# Patient Record
Sex: FEMALE | Race: BLACK OR AFRICAN AMERICAN | NOT HISPANIC OR LATINO | Employment: UNEMPLOYED | URBAN - METROPOLITAN AREA
[De-identification: names, ages, dates, MRNs, and addresses within clinical notes are randomized per-mention and may not be internally consistent; named-entity substitution may affect disease eponyms.]

---

## 2017-04-11 ENCOUNTER — WALK IN (OUTPATIENT)
Dept: URGENT CARE | Age: 52
End: 2017-04-11

## 2017-04-11 VITALS
RESPIRATION RATE: 16 BRPM | TEMPERATURE: 98.2 F | HEART RATE: 92 BPM | DIASTOLIC BLOOD PRESSURE: 70 MMHG | OXYGEN SATURATION: 98 % | SYSTOLIC BLOOD PRESSURE: 110 MMHG

## 2017-04-11 DIAGNOSIS — J01.00 ACUTE NON-RECURRENT MAXILLARY SINUSITIS: Primary | ICD-10-CM

## 2017-04-11 PROCEDURE — 99213 OFFICE O/P EST LOW 20 MIN: CPT | Performed by: NURSE PRACTITIONER

## 2017-04-11 RX ORDER — MERCAPTOPURINE 50 MG/1
50 TABLET ORAL DAILY
COMMUNITY
End: 2022-01-24 | Stop reason: ALTCHOICE

## 2017-04-11 RX ORDER — AMOXICILLIN AND CLAVULANATE POTASSIUM 875; 125 MG/1; MG/1
1 TABLET, FILM COATED ORAL 2 TIMES DAILY
Qty: 20 TABLET | Refills: 0 | Status: SHIPPED | OUTPATIENT
Start: 2017-04-11 | End: 2017-04-21

## 2018-06-12 ENCOUNTER — WALK IN (OUTPATIENT)
Dept: URGENT CARE | Age: 53
End: 2018-06-12

## 2018-06-12 VITALS
SYSTOLIC BLOOD PRESSURE: 114 MMHG | TEMPERATURE: 99.3 F | OXYGEN SATURATION: 99 % | HEART RATE: 82 BPM | DIASTOLIC BLOOD PRESSURE: 72 MMHG | RESPIRATION RATE: 16 BRPM

## 2018-06-12 DIAGNOSIS — J06.9 ACUTE URI: Primary | ICD-10-CM

## 2018-06-12 LAB — S PYO AG THROAT QL: NEGATIVE

## 2018-06-12 PROCEDURE — 87880 STREP A ASSAY W/OPTIC: CPT | Performed by: NURSE PRACTITIONER

## 2018-06-12 PROCEDURE — 99213 OFFICE O/P EST LOW 20 MIN: CPT | Performed by: NURSE PRACTITIONER

## 2020-06-23 ENCOUNTER — TELEPHONE ENCOUNTER (OUTPATIENT)
Dept: URBAN - METROPOLITAN AREA CLINIC 115 | Facility: CLINIC | Age: 55
End: 2020-06-23

## 2020-09-27 ENCOUNTER — ERX REFILL RESPONSE (OUTPATIENT)
Age: 55
End: 2020-09-27

## 2020-09-27 ENCOUNTER — TELEPHONE ENCOUNTER (OUTPATIENT)
Dept: URBAN - METROPOLITAN AREA CLINIC 92 | Facility: CLINIC | Age: 55
End: 2020-09-27

## 2020-09-27 RX ORDER — MERCAPTOPURINE 50 MG/1
TAKE 1 TABLET BY MOUTH EVERY DAY TABLET ORAL ONCE A DAY
Qty: 30 TABLET | Refills: 0

## 2020-09-27 RX ORDER — MERCAPTOPURINE 50 MG/1
TAKE 1 TABLET BY MOUTH EVERY DAY TABLET ORAL
Qty: 30 TABLET | Refills: 0
Start: 2020-01-16

## 2020-10-08 ENCOUNTER — OFFICE VISIT (OUTPATIENT)
Dept: URBAN - METROPOLITAN AREA TELEHEALTH 2 | Facility: TELEHEALTH | Age: 55
End: 2020-10-08
Payer: COMMERCIAL

## 2020-10-08 DIAGNOSIS — K21.9 GASTROESOPHAGEAL REFLUX DISEASE WITHOUT ESOPHAGITIS: ICD-10-CM

## 2020-10-08 DIAGNOSIS — K50.00 CROHN'S DISEASE OF SMALL INTESTINE WITHOUT COMPLICATION: ICD-10-CM

## 2020-10-08 PROCEDURE — 99213 OFFICE O/P EST LOW 20 MIN: CPT | Performed by: INTERNAL MEDICINE

## 2020-10-08 PROCEDURE — G8427 DOCREV CUR MEDS BY ELIG CLIN: HCPCS | Performed by: INTERNAL MEDICINE

## 2020-10-08 PROCEDURE — G8420 CALC BMI NORM PARAMETERS: HCPCS | Performed by: INTERNAL MEDICINE

## 2020-10-08 PROCEDURE — 1036F TOBACCO NON-USER: CPT | Performed by: INTERNAL MEDICINE

## 2020-10-08 PROCEDURE — G8484 FLU IMMUNIZE NO ADMIN: HCPCS | Performed by: INTERNAL MEDICINE

## 2020-10-08 PROCEDURE — 3017F COLORECTAL CA SCREEN DOC REV: CPT | Performed by: INTERNAL MEDICINE

## 2020-10-08 PROCEDURE — G9903 PT SCRN TBCO ID AS NON USER: HCPCS | Performed by: INTERNAL MEDICINE

## 2020-10-08 RX ORDER — ONDANSETRON 8 MG/1
TAKE 1 TABLET BY MOUTH TWICE A DAY AS NEEDED FOR NAUSEA TABLET ORAL
Qty: 180 | Refills: 0 | Status: ACTIVE | COMMUNITY
Start: 2019-08-20

## 2020-10-08 RX ORDER — MERCAPTOPURINE 50 MG/1
TAKE 1 TABLET BY MOUTH EVERY DAY TABLET ORAL ONCE A DAY
Qty: 30 TABLET | Refills: 0 | Status: ACTIVE | COMMUNITY

## 2020-10-08 RX ORDER — MERCAPTOPURINE 50 MG/1
TAKE 1 TABLET BY MOUTH EVERY DAY TABLET ORAL ONCE A DAY
Qty: 90 TABLET | Refills: 2

## 2020-10-08 RX ORDER — ESOMEPRAZOLE MAGNESIUM 40 MG/1
TAKE 1 CAPSULE BY MOUTH EVERY DAY CAPSULE, DELAYED RELEASE PELLETS ORAL
Qty: 90 | Refills: 3 | Status: ACTIVE | COMMUNITY
Start: 2020-01-20

## 2020-10-08 NOTE — HPI-TODAY'S VISIT:
Mr. Mak was seen today after last visit in office in August 2018.  She has a history of small bowel Crohn's disease which is under remission on 6-MP 50 mg once a day.  She also have constipation for which she takes MiraLAX and MiraLAX is helping her symptoms.  She reports having reflux and epigastric discomfort and she had been PPI intermittently.  Patient reports having labs done with her primary care physician within the last week and was told to be unremarkable.  She admits to having increased anxiety anxiety with ongoing pandemic however recently she started seeing a therapist which is helping her symptoms also she takes Xanax as needed basis denies any unintentional weight loss denies any diarrhea nausea vomiting. Last colonoscopy was March 2019 there is no evidence of active disease for surveillance biopsies were negative for dysplasia.

## 2021-02-15 ENCOUNTER — TELEPHONE ENCOUNTER (OUTPATIENT)
Dept: URBAN - METROPOLITAN AREA CLINIC 115 | Facility: CLINIC | Age: 56
End: 2021-02-15

## 2021-02-15 ENCOUNTER — TELEPHONE ENCOUNTER (OUTPATIENT)
Dept: URBAN - METROPOLITAN AREA CLINIC 92 | Facility: CLINIC | Age: 56
End: 2021-02-15

## 2021-02-15 RX ORDER — ESOMEPRAZOLE MAGNESIUM 40 MG/1
TAKE 1 CAPSULE BY MOUTH EVERY DAY CAPSULE, DELAYED RELEASE PELLETS ORAL
Qty: 90 | Refills: 3 | Status: ACTIVE | COMMUNITY
Start: 2020-01-20

## 2021-02-15 RX ORDER — MERCAPTOPURINE 50 MG/1
TAKE 1 TABLET BY MOUTH EVERY DAY TABLET ORAL ONCE A DAY
Qty: 90 TABLET | Refills: 2 | Status: ACTIVE | COMMUNITY

## 2021-02-15 RX ORDER — MERCAPTOPURINE 50 MG/1
TAKE 1 TABLET BY MOUTH EVERY DAY TABLET ORAL ONCE A DAY
Qty: 90 TABLET

## 2021-02-15 RX ORDER — ONDANSETRON 8 MG/1
TAKE 1 TABLET BY MOUTH TWICE A DAY AS NEEDED FOR NAUSEA TABLET ORAL
Qty: 180 | Refills: 0 | Status: ACTIVE | COMMUNITY
Start: 2019-08-20

## 2021-02-15 RX ORDER — DICYCLOMINE HYDROCHLORIDE 20 MG/1
1/2 TO 1  TABLET TABLET ORAL THREE TIMES A DAY
Qty: 60 TABLET | Refills: 0 | OUTPATIENT
Start: 2021-02-16 | End: 2021-03-18

## 2021-02-25 ENCOUNTER — TELEPHONE ENCOUNTER (OUTPATIENT)
Dept: URBAN - METROPOLITAN AREA CLINIC 92 | Facility: CLINIC | Age: 56
End: 2021-02-25

## 2021-02-25 ENCOUNTER — OFFICE VISIT (OUTPATIENT)
Dept: URBAN - METROPOLITAN AREA TELEHEALTH 2 | Facility: TELEHEALTH | Age: 56
End: 2021-02-25
Payer: COMMERCIAL

## 2021-02-25 ENCOUNTER — LAB OUTSIDE AN ENCOUNTER (OUTPATIENT)
Dept: URBAN - METROPOLITAN AREA TELEHEALTH 2 | Facility: TELEHEALTH | Age: 56
End: 2021-02-25

## 2021-02-25 DIAGNOSIS — K21.00 GASTROESOPHAGEAL REFLUX DISEASE WITH ESOPHAGITIS WITHOUT HEMORRHAGE: ICD-10-CM

## 2021-02-25 DIAGNOSIS — R13.19 OTHER DYSPHAGIA: ICD-10-CM

## 2021-02-25 DIAGNOSIS — K50.80 CROHN'S DISEASE OF BOTH SMALL AND LARGE INTESTINE: ICD-10-CM

## 2021-02-25 PROBLEM — 266433003: Status: ACTIVE | Noted: 2021-02-25

## 2021-02-25 PROCEDURE — 99213 OFFICE O/P EST LOW 20 MIN: CPT | Performed by: INTERNAL MEDICINE

## 2021-02-25 RX ORDER — MERCAPTOPURINE 50 MG/1
TAKE 1 TABLET BY MOUTH EVERY DAY TABLET ORAL ONCE A DAY
Qty: 90 TABLET | Refills: 0

## 2021-02-25 RX ORDER — ONDANSETRON 8 MG/1
TAKE 1 TABLET BY MOUTH TWICE A DAY AS NEEDED FOR NAUSEA TABLET ORAL
Qty: 180 | Refills: 0 | Status: ACTIVE | COMMUNITY
Start: 2019-08-20

## 2021-02-25 RX ORDER — ESOMEPRAZOLE MAGNESIUM 40 MG/1
TAKE 1 CAPSULE BY MOUTH EVERY DAY CAPSULE, DELAYED RELEASE PELLETS ORAL
Qty: 90 | Refills: 3 | Status: ACTIVE | COMMUNITY
Start: 2020-01-20

## 2021-02-25 RX ORDER — MERCAPTOPURINE 50 MG/1
TAKE 1 TABLET BY MOUTH EVERY DAY TABLET ORAL ONCE A DAY
Qty: 90 TABLET | Refills: 2 | Status: ACTIVE | COMMUNITY

## 2021-02-25 RX ORDER — DICYCLOMINE HYDROCHLORIDE 20 MG/1
1/2 TO 1  TABLET TABLET ORAL THREE TIMES A DAY
Qty: 60 TABLET | Refills: 0 | Status: ACTIVE | COMMUNITY
Start: 2021-02-16 | End: 2021-03-18

## 2021-02-25 NOTE — HPI-TODAY'S VISIT:
pt was seen today after last visit in office in August 2018.  She has a history of small bowel Crohn's disease which is under remission on 6-MP 50 mg once a day.  She also have constipation for which she takes MiraLAX and MiraLAX is helping her symptoms.  She reports having reflux and epigastric discomfort and she had been PPI intermittently.  Patient reports having labs done with her primary care physician within the last week and was told to be unremarkable.  She admits to having increased anxiety anxiety with ongoing pandemic however recently she started seeing a therapist which is helping her symptoms also she takes Xanax as needed basis denies any unintentional weight loss denies any diarrhea nausea vomiting. Last colonoscopy was March 2019 there is no evidence of active disease for surveillance biopsies were negative for dysplasia.  2/25/21 : Ms. Mak was seen today over telehealth for complaints of having abdominal bloating acid reflux as well as hoarseness of voice and intermittent dysphagia.  Patient stated recently she has had abdominal bloating and cramping however denies any diarrhea.  Patient stated she had bloating after she ate food episode of loose stools after she ate outside and she thought initially it was a gastroenteritis however symptoms resolved spontaneously.  Denies any chronic diarrhea denies any blood mixed in the stools.  Denies any melanotic stools however she admits for anxiety causing her upper GI symptoms and lower GI symptoms worse.  She had been given Ativan for anxiety.  Other complaints also includes nausea for which she takes Zofran frequently.  Last upper endoscopy was in 2012 that showed erosive gastritis.  She is also have history of IBS. Regarding Crohn's disease she had small bowel Crohn's disease which has been stable on 6-MP.

## 2021-02-26 ENCOUNTER — DASHBOARD ENCOUNTERS (OUTPATIENT)
Age: 56
End: 2021-02-26

## 2021-03-06 ENCOUNTER — WEB ENCOUNTER (OUTPATIENT)
Dept: URBAN - METROPOLITAN AREA CLINIC 115 | Facility: CLINIC | Age: 56
End: 2021-03-06

## 2021-03-11 ENCOUNTER — WEB ENCOUNTER (OUTPATIENT)
Dept: URBAN - METROPOLITAN AREA CLINIC 115 | Facility: CLINIC | Age: 56
End: 2021-03-11

## 2021-03-15 ENCOUNTER — WEB ENCOUNTER (OUTPATIENT)
Dept: URBAN - METROPOLITAN AREA CLINIC 115 | Facility: CLINIC | Age: 56
End: 2021-03-15

## 2021-03-23 ENCOUNTER — TELEPHONE ENCOUNTER (OUTPATIENT)
Dept: URBAN - METROPOLITAN AREA CLINIC 92 | Facility: CLINIC | Age: 56
End: 2021-03-23

## 2021-03-23 ENCOUNTER — OFFICE VISIT (OUTPATIENT)
Dept: URBAN - METROPOLITAN AREA SURGERY CENTER 13 | Facility: SURGERY CENTER | Age: 56
End: 2021-03-23

## 2021-03-23 RX ORDER — ESOMEPRAZOLE MAGNESIUM 40 MG/1
TAKE 1 CAPSULE BY MOUTH EVERY DAY CAPSULE, DELAYED RELEASE PELLETS ORAL
Qty: 90 | Refills: 3 | Status: ACTIVE | COMMUNITY
Start: 2020-01-20

## 2021-03-23 RX ORDER — MERCAPTOPURINE 50 MG/1
TAKE 1 TABLET BY MOUTH EVERY DAY TABLET ORAL ONCE A DAY
Qty: 90 TABLET | Refills: 0 | Status: ACTIVE | COMMUNITY

## 2021-03-23 RX ORDER — ONDANSETRON 8 MG/1
TAKE 1 TABLET BY MOUTH TWICE A DAY AS NEEDED FOR NAUSEA TABLET ORAL
Qty: 180 | Refills: 0 | Status: ACTIVE | COMMUNITY
Start: 2019-08-20

## 2021-03-23 RX ORDER — PROMETHAZINE HYDROCHLORIDE 12.5 MG/1
1 TABLET AS NEEDED TABLET ORAL
Qty: 30 TABLET | Refills: 1 | OUTPATIENT
Start: 2021-03-23 | End: 2021-05-22

## 2021-03-25 ENCOUNTER — TELEPHONE ENCOUNTER (OUTPATIENT)
Dept: URBAN - METROPOLITAN AREA CLINIC 92 | Facility: CLINIC | Age: 56
End: 2021-03-25

## 2021-03-25 ENCOUNTER — OFFICE VISIT (OUTPATIENT)
Dept: URBAN - METROPOLITAN AREA SURGERY CENTER 13 | Facility: SURGERY CENTER | Age: 56
End: 2021-03-25
Payer: COMMERCIAL

## 2021-03-25 ENCOUNTER — LAB OUTSIDE AN ENCOUNTER (OUTPATIENT)
Dept: URBAN - METROPOLITAN AREA CLINIC 92 | Facility: CLINIC | Age: 56
End: 2021-03-25

## 2021-03-25 DIAGNOSIS — R10.13 ABDOMINAL DISCOMFORT, EPIGASTRIC: ICD-10-CM

## 2021-03-25 DIAGNOSIS — K29.80 ACUTE DUODENITIS: ICD-10-CM

## 2021-03-25 DIAGNOSIS — K22.2 ACQUIRED ESOPHAGEAL RING: ICD-10-CM

## 2021-03-25 DIAGNOSIS — K29.60 ADENOPAPILLOMATOSIS GASTRICA: ICD-10-CM

## 2021-03-25 DIAGNOSIS — K22.8 COLUMNAR-LINED ESOPHAGUS: ICD-10-CM

## 2021-03-25 PROBLEM — 56689002: Status: ACTIVE | Noted: 2020-10-08

## 2021-03-25 PROBLEM — 79922009: Status: ACTIVE | Noted: 2021-03-25

## 2021-03-25 PROCEDURE — G8907 PT DOC NO EVENTS ON DISCHARG: HCPCS | Performed by: INTERNAL MEDICINE

## 2021-03-25 PROCEDURE — 43239 EGD BIOPSY SINGLE/MULTIPLE: CPT | Performed by: INTERNAL MEDICINE

## 2021-03-25 PROCEDURE — 43248 EGD GUIDE WIRE INSERTION: CPT | Performed by: INTERNAL MEDICINE

## 2021-03-25 RX ORDER — ONDANSETRON 8 MG/1
TAKE 1 TABLET BY MOUTH TWICE A DAY AS NEEDED FOR NAUSEA TABLET ORAL
Qty: 180 | Refills: 0 | Status: ACTIVE | COMMUNITY
Start: 2019-08-20

## 2021-03-25 RX ORDER — SUCRALFATE 1 G
1 TABLET PRIOR TO MEALS TABLET ORAL THREE TIMES A DAY
Qty: 90 TABLET | Refills: 1 | OUTPATIENT
Start: 2021-03-25 | End: 2021-05-24

## 2021-03-25 RX ORDER — PROMETHAZINE HYDROCHLORIDE 12.5 MG/1
1 TABLET AS NEEDED TABLET ORAL
Qty: 30 TABLET | Refills: 1 | Status: ACTIVE | COMMUNITY
Start: 2021-03-23 | End: 2021-05-22

## 2021-03-25 RX ORDER — METHYLPREDNISOLONE 4 MG/1
AS DIRECTED TABLET ORAL ONCE A DAY
Qty: 21 TABLET | Refills: 0 | OUTPATIENT
Start: 2021-03-25 | End: 2021-03-30

## 2021-03-25 RX ORDER — MERCAPTOPURINE 50 MG/1
TAKE 1 TABLET BY MOUTH EVERY DAY TABLET ORAL ONCE A DAY
Qty: 90 TABLET | Refills: 0 | Status: ACTIVE | COMMUNITY

## 2021-03-25 RX ORDER — ESOMEPRAZOLE MAGNESIUM 40 MG/1
TAKE 1 CAPSULE BY MOUTH EVERY DAY CAPSULE, DELAYED RELEASE PELLETS ORAL
Qty: 90 | Refills: 3 | Status: ACTIVE | COMMUNITY
Start: 2020-01-20

## 2021-03-26 LAB
A/G RATIO: 1.6
ALBUMIN: 4.7
ALKALINE PHOSPHATASE: 76
ALT (SGPT): 13
AST (SGOT): 19
BASO (ABSOLUTE): 0
BASOS: 0
BILIRUBIN, TOTAL: 0.3
BUN/CREATININE RATIO: 13
BUN: 12
CALCIUM: 9.7
CARBON DIOXIDE, TOTAL: 18
CHLORIDE: 105
CREATININE: 0.95
EGFR IF AFRICN AM: 77
EGFR IF NONAFRICN AM: 67
EOS (ABSOLUTE): 0
EOS: 1
GLOBULIN, TOTAL: 3
GLUCOSE: 62
HEMATOCRIT: 36.2
HEMATOLOGY COMMENTS:: (no result)
HEMOGLOBIN: 12.5
IMMATURE CELLS: (no result)
IMMATURE GRANS (ABS): 0
IMMATURE GRANULOCYTES: 0
LIPASE: 35
LYMPHS (ABSOLUTE): 0.9
LYMPHS: 17
MCH: 29.9
MCHC: 34.5
MCV: 87
MONOCYTES(ABSOLUTE): 0.4
MONOCYTES: 8
NEUTROPHILS (ABSOLUTE): 4.1
NEUTROPHILS: 74
NRBC: (no result)
PLATELETS: 303
POTASSIUM: 4
PROTEIN, TOTAL: 7.7
RBC: 4.18
RDW: 13.8
SODIUM: 141
WBC: 5.4

## 2021-04-07 PROBLEM — 40890009: Status: ACTIVE | Noted: 2021-02-25

## 2021-04-07 PROBLEM — 266435005: Status: ACTIVE | Noted: 2020-10-08

## 2021-04-13 ENCOUNTER — OFFICE VISIT (OUTPATIENT)
Dept: URBAN - METROPOLITAN AREA SURGERY CENTER 13 | Facility: SURGERY CENTER | Age: 56
End: 2021-04-13

## 2021-04-13 ENCOUNTER — WEB ENCOUNTER (OUTPATIENT)
Dept: URBAN - METROPOLITAN AREA CLINIC 115 | Facility: CLINIC | Age: 56
End: 2021-04-13

## 2021-04-20 ENCOUNTER — OFFICE VISIT (OUTPATIENT)
Dept: URBAN - METROPOLITAN AREA SURGERY CENTER 13 | Facility: SURGERY CENTER | Age: 56
End: 2021-04-20

## 2021-04-22 ENCOUNTER — ERX REFILL RESPONSE (OUTPATIENT)
Dept: URBAN - METROPOLITAN AREA CLINIC 82 | Facility: CLINIC | Age: 56
End: 2021-04-22

## 2021-04-22 RX ORDER — ESOMEPRAZOLE MAGNESIUM 40 MG/1
TAKE 1 CAPSULE BY MOUTH EVERY DAY CAPSULE, DELAYED RELEASE ORAL
Qty: 90 | Refills: 2

## 2021-05-13 ENCOUNTER — WEB ENCOUNTER (OUTPATIENT)
Dept: URBAN - METROPOLITAN AREA CLINIC 115 | Facility: CLINIC | Age: 56
End: 2021-05-13

## 2021-05-16 ENCOUNTER — WEB ENCOUNTER (OUTPATIENT)
Dept: URBAN - METROPOLITAN AREA CLINIC 115 | Facility: CLINIC | Age: 56
End: 2021-05-16

## 2021-05-16 RX ORDER — ONDANSETRON 8 MG/1
TAKE 1 TABLET BY MOUTH TWICE A DAY AS NEEDED FOR NAUSEA TABLET ORAL
Qty: 180 | Refills: 0
Start: 2019-08-20

## 2021-05-17 ENCOUNTER — ERX REFILL RESPONSE (OUTPATIENT)
Dept: URBAN - METROPOLITAN AREA SURGERY CENTER 13 | Facility: SURGERY CENTER | Age: 56
End: 2021-05-17

## 2021-05-17 RX ORDER — SUCRALFATE 1 G/1
TAKE 1 TABLET BY MOUTH PRIOR TO MEALS THREE TIMES A DAY TABLET ORAL
Qty: 270 TABLET | Refills: 1 | OUTPATIENT

## 2021-05-17 RX ORDER — SUCRALFATE 1 G/1
1 TABLET PRIOR TO MEALS TABLET ORAL THREE TIMES A DAY
Qty: 90 | Refills: 1 | OUTPATIENT

## 2021-05-18 ENCOUNTER — TELEPHONE ENCOUNTER (OUTPATIENT)
Dept: URBAN - METROPOLITAN AREA CLINIC 92 | Facility: CLINIC | Age: 56
End: 2021-05-18

## 2021-05-18 RX ORDER — PLECANATIDE 3 MG/1
1 TABLET TABLET ORAL ONCE A DAY
Qty: 90 TABLET | Refills: 3 | OUTPATIENT

## 2021-07-17 ENCOUNTER — ERX REFILL RESPONSE (OUTPATIENT)
Dept: URBAN - METROPOLITAN AREA CLINIC 82 | Facility: CLINIC | Age: 56
End: 2021-07-17

## 2021-07-17 RX ORDER — LINACLOTIDE 290 UG/1
1 CAPSULE AT LEAST 30 MINUTES BEFORE THE FIRST MEAL OF THE DAY ON AN EMPTY STOMACH CAPSULE, GELATIN COATED ORAL ONCE A DAY
Qty: 90 CAPSULE | Refills: 3 | OUTPATIENT

## 2021-07-17 RX ORDER — PLECANATIDE 3 MG/1
1 TABLET TABLET ORAL ONCE A DAY
Qty: 90 TABLET | Refills: 3 | OUTPATIENT

## 2021-08-26 ENCOUNTER — TELEPHONE ENCOUNTER (OUTPATIENT)
Dept: URBAN - METROPOLITAN AREA CLINIC 115 | Facility: CLINIC | Age: 56
End: 2021-08-26

## 2021-08-26 RX ORDER — ONDANSETRON 8 MG/1
TAKE 1 TABLET BY MOUTH TWICE A DAY AS NEEDED FOR NAUSEA TABLET ORAL
Qty: 180 | Refills: 0
Start: 2019-08-20

## 2021-10-07 ENCOUNTER — ERX REFILL RESPONSE (OUTPATIENT)
Dept: URBAN - METROPOLITAN AREA CLINIC 82 | Facility: CLINIC | Age: 56
End: 2021-10-07

## 2021-10-07 RX ORDER — PROMETHAZINE HYDROCHLORIDE 12.5 MG/1
TAKE 1 TABLET AS NEEDED ONCE A DAY AT BED TIME AS NEEDED FOR NAUSEA ORAL TABLET ORAL
Qty: 30 TABLET | Refills: 2 | OUTPATIENT

## 2021-12-13 ENCOUNTER — WEB ENCOUNTER (OUTPATIENT)
Dept: URBAN - METROPOLITAN AREA CLINIC 115 | Facility: CLINIC | Age: 56
End: 2021-12-13

## 2021-12-13 RX ORDER — ONDANSETRON 8 MG/1
TAKE 1 TABLET BY MOUTH TWICE A DAY AS NEEDED FOR NAUSEA TABLET ORAL
Qty: 180 | Refills: 0
Start: 2019-08-20

## 2021-12-30 ENCOUNTER — TELEPHONE (OUTPATIENT)
Dept: URGENT CARE | Age: 56
End: 2021-12-30

## 2021-12-30 ENCOUNTER — LAB SERVICES (OUTPATIENT)
Dept: LAB | Age: 56
End: 2021-12-30

## 2021-12-30 ENCOUNTER — WALK IN (OUTPATIENT)
Dept: URGENT CARE | Age: 56
End: 2021-12-30

## 2021-12-30 VITALS
DIASTOLIC BLOOD PRESSURE: 80 MMHG | RESPIRATION RATE: 18 BRPM | WEIGHT: 155 LBS | HEART RATE: 110 BPM | OXYGEN SATURATION: 98 % | SYSTOLIC BLOOD PRESSURE: 124 MMHG | TEMPERATURE: 98.3 F | BODY MASS INDEX: 25.02 KG/M2

## 2021-12-30 DIAGNOSIS — R00.0 TACHYCARDIA: ICD-10-CM

## 2021-12-30 DIAGNOSIS — S05.02XA ABRASION OF LEFT CORNEA, INITIAL ENCOUNTER: ICD-10-CM

## 2021-12-30 DIAGNOSIS — R00.0 TACHYCARDIA: Primary | ICD-10-CM

## 2021-12-30 LAB
ANION GAP SERPL CALC-SCNC: 13 MMOL/L (ref 10–20)
BASOPHILS # BLD: 0 K/MCL (ref 0–0.3)
BASOPHILS NFR BLD: 0 %
BUN SERPL-MCNC: 7 MG/DL (ref 6–20)
BUN/CREAT SERPL: 10 (ref 7–25)
CALCIUM SERPL-MCNC: 9.5 MG/DL (ref 8.4–10.2)
CHLORIDE SERPL-SCNC: 103 MMOL/L (ref 98–107)
CO2 SERPL-SCNC: 28 MMOL/L (ref 21–32)
CREAT SERPL-MCNC: 0.7 MG/DL (ref 0.51–0.95)
DEPRECATED RDW RBC: 41.6 FL (ref 39–50)
EOSINOPHIL # BLD: 0 K/MCL (ref 0–0.5)
EOSINOPHIL NFR BLD: 1 %
ERYTHROCYTE [DISTWIDTH] IN BLOOD: 13.5 % (ref 11–15)
FASTING DURATION TIME PATIENT: ABNORMAL H
GFR SERPLBLD BASED ON 1.73 SQ M-ARVRAT: >90 ML/MIN
GLUCOSE SERPL-MCNC: 117 MG/DL (ref 70–99)
HCT VFR BLD CALC: 34 % (ref 36–46.5)
HGB BLD-MCNC: 11.7 G/DL (ref 12–15.5)
LYMPHOCYTES # BLD: 1 K/MCL (ref 1–4)
LYMPHOCYTES NFR BLD: 23 %
MCH RBC QN AUTO: 29.5 PG (ref 26–34)
MCHC RBC AUTO-ENTMCNC: 34.4 G/DL (ref 32–36.5)
MCV RBC AUTO: 85.6 FL (ref 78–100)
MONOCYTES # BLD: 0.3 K/MCL (ref 0.3–0.9)
MONOCYTES NFR BLD: 7 %
NEUTROPHILS # BLD: 3.2 K/MCL (ref 1.8–7.7)
NEUTROPHILS NFR BLD: 69 %
PLATELET # BLD AUTO: 299 K/MCL (ref 140–450)
POTASSIUM SERPL-SCNC: 4.2 MMOL/L (ref 3.4–5.1)
RBC # BLD: 3.97 MIL/MCL (ref 4–5.2)
SODIUM SERPL-SCNC: 140 MMOL/L (ref 135–145)
TROPONIN I SERPL DL<=0.01 NG/ML-MCNC: <4 NG/L
WBC # BLD: 4.6 K/MCL (ref 4.2–11)

## 2021-12-30 PROCEDURE — 36415 COLL VENOUS BLD VENIPUNCTURE: CPT | Performed by: INTERNAL MEDICINE

## 2021-12-30 PROCEDURE — 99203 OFFICE O/P NEW LOW 30 MIN: CPT | Performed by: FAMILY MEDICINE

## 2021-12-30 PROCEDURE — 84484 ASSAY OF TROPONIN QUANT: CPT | Performed by: INTERNAL MEDICINE

## 2021-12-30 PROCEDURE — 85025 COMPLETE CBC W/AUTO DIFF WBC: CPT | Performed by: INTERNAL MEDICINE

## 2021-12-30 PROCEDURE — 80048 BASIC METABOLIC PNL TOTAL CA: CPT | Performed by: INTERNAL MEDICINE

## 2021-12-30 PROCEDURE — 93000 ELECTROCARDIOGRAM COMPLETE: CPT | Performed by: FAMILY MEDICINE

## 2021-12-30 RX ORDER — TRAMADOL HYDROCHLORIDE 50 MG/1
50 TABLET ORAL EVERY 6 HOURS PRN
Qty: 12 TABLET | Refills: 0 | Status: SHIPPED | OUTPATIENT
Start: 2021-12-30 | End: 2022-01-02

## 2021-12-30 RX ORDER — ESTRADIOL 0.1 MG/G
2 CREAM VAGINAL
COMMUNITY
Start: 2021-12-21

## 2021-12-30 RX ORDER — ZOLPIDEM TARTRATE 10 MG/1
10 TABLET ORAL DAILY PRN
COMMUNITY
Start: 2021-10-12 | End: 2022-01-24 | Stop reason: DRUGHIGH

## 2021-12-30 RX ORDER — ONDANSETRON 4 MG/1
TABLET, FILM COATED ORAL
COMMUNITY
Start: 2021-12-13

## 2021-12-30 RX ORDER — PROMETHAZINE HYDROCHLORIDE 12.5 MG/1
TABLET ORAL
COMMUNITY

## 2021-12-30 RX ORDER — POLYMYXIN B SULFATE AND TRIMETHOPRIM 1; 10000 MG/ML; [USP'U]/ML
1 SOLUTION OPHTHALMIC 4 TIMES DAILY
Qty: 10 ML | Refills: 0 | Status: SHIPPED | OUTPATIENT
Start: 2021-12-30 | End: 2022-01-09

## 2021-12-30 RX ORDER — ONDANSETRON 4 MG/1
4 TABLET, ORALLY DISINTEGRATING ORAL 3 TIMES DAILY PRN
COMMUNITY

## 2022-01-24 ENCOUNTER — TELEPHONE (OUTPATIENT)
Dept: BEHAVIORAL HEALTH | Age: 57
End: 2022-01-24

## 2022-01-24 ENCOUNTER — HOSPITAL ENCOUNTER (OUTPATIENT)
Age: 57
Discharge: HOME | End: 2022-01-24

## 2022-01-24 VITALS
DIASTOLIC BLOOD PRESSURE: 84 MMHG | HEART RATE: 71 BPM | BODY MASS INDEX: 24.91 KG/M2 | TEMPERATURE: 98.1 F | OXYGEN SATURATION: 96 % | HEIGHT: 66 IN | RESPIRATION RATE: 20 BRPM | SYSTOLIC BLOOD PRESSURE: 127 MMHG | WEIGHT: 155 LBS

## 2022-01-24 RX ORDER — ALPRAZOLAM 0.5 MG/1
0.5 TABLET ORAL EVERY 6 HOURS PRN
COMMUNITY
Start: 2021-12-30

## 2022-01-24 RX ORDER — ZOLPIDEM TARTRATE 5 MG/1
5 TABLET ORAL NIGHTLY PRN
COMMUNITY

## 2022-01-24 RX ORDER — TRAMADOL HYDROCHLORIDE 50 MG/1
50 TABLET ORAL EVERY 6 HOURS PRN
COMMUNITY

## 2022-01-24 ASSESSMENT — COGNITIVE AND FUNCTIONAL STATUS - GENERAL
RELIABILITY: APPEARS TO BE TRUTHFUL/RELIABLE
MOTOR_BEHAVIOR-RETARDATION_CALCULATED: CALM AND PURPOSEFUL
MOTOR_BEHAVIOR-AGITATION_CALCULATED: CALM AND PURPOSEFUL
COGNITIVE_PROCESS: RACING
INSIGHT: FAIR
SPEECH: TANGENTIAL
ATTENTION_CALCULATED: MAINTAINS ATTENTION
LEVEL_OF_CONSCIOUSNESS_CALCULATED: ALERT
JUDGEMENT: FAIR
AFFECT: ANXIOUS
BEHAVIOR: APPROPRIATE TO SITUATION
ORIENTATION: ORIENTED (PERSON/PLACE/TIME)
COGNITIVE_CONTENT: PERSERVATION/RUMINATION
PERCEPTUAL_MISINTERPRETATIONS_HALLUCINATIONS: CLEAR REALITY BASED PERCEPTIONS
APPEARANCE_AND_DRESS: APPROPRIATE TO SITUATION
MEMORY: INTACT
MENTAL_STATUS_OBSERVED: AO4
MOOD: ANXIOUS

## 2022-01-24 ASSESSMENT — LIFESTYLE VARIABLES
HOW OFTEN DO YOU HAVE 6 OR MORE DRINKS ON ONE OCCASION: NEVER
HOW OFTEN DO YOU HAVE A DRINK CONTAINING ALCOHOL: NEVER
ALCOHOL_USE_STATUS: NO OR LOW RISK WITH VALIDATED TOOL
HOW OFTEN DO YOU HAVE A DRINK CONTAINING ALCOHOL: NEVER
HOW MANY STANDARD DRINKS CONTAINING ALCOHOL DO YOU HAVE ON A TYPICAL DAY: 0,1 OR 2
ALCOHOL_USE_STATUS: NO OR LOW RISK WITH VALIDATED TOOL
AUDIT-C TOTAL SCORE: 0

## 2022-01-24 ASSESSMENT — PAIN SCALES - GENERAL: PAINLEVEL_OUTOF10: 0

## 2022-01-25 ENCOUNTER — TELEPHONE (OUTPATIENT)
Dept: BEHAVIORAL HEALTH | Age: 57
End: 2022-01-25

## 2022-01-26 ENCOUNTER — APPOINTMENT (OUTPATIENT)
Dept: BEHAVIORAL HEALTH | Age: 57
End: 2022-01-26
Attending: PSYCHIATRY & NEUROLOGY

## 2022-01-27 ENCOUNTER — APPOINTMENT (OUTPATIENT)
Dept: BEHAVIORAL HEALTH | Age: 57
End: 2022-01-27
Attending: PSYCHIATRY & NEUROLOGY

## 2022-01-28 ENCOUNTER — APPOINTMENT (OUTPATIENT)
Dept: BEHAVIORAL HEALTH | Age: 57
End: 2022-01-28
Attending: PSYCHIATRY & NEUROLOGY

## 2022-01-31 ENCOUNTER — APPOINTMENT (OUTPATIENT)
Dept: BEHAVIORAL HEALTH | Age: 57
End: 2022-01-31
Attending: PSYCHIATRY & NEUROLOGY

## 2022-02-01 ENCOUNTER — APPOINTMENT (OUTPATIENT)
Dept: BEHAVIORAL HEALTH | Age: 57
End: 2022-02-01
Attending: PSYCHIATRY & NEUROLOGY

## 2022-02-02 ENCOUNTER — APPOINTMENT (OUTPATIENT)
Dept: BEHAVIORAL HEALTH | Age: 57
End: 2022-02-02
Attending: PSYCHIATRY & NEUROLOGY

## 2022-02-03 ENCOUNTER — APPOINTMENT (OUTPATIENT)
Dept: BEHAVIORAL HEALTH | Age: 57
End: 2022-02-03
Attending: PSYCHIATRY & NEUROLOGY

## 2022-02-04 ENCOUNTER — APPOINTMENT (OUTPATIENT)
Dept: BEHAVIORAL HEALTH | Age: 57
End: 2022-02-04
Attending: PSYCHIATRY & NEUROLOGY

## 2022-02-07 ENCOUNTER — APPOINTMENT (OUTPATIENT)
Dept: BEHAVIORAL HEALTH | Age: 57
End: 2022-02-07
Attending: PSYCHIATRY & NEUROLOGY

## 2022-02-08 ENCOUNTER — APPOINTMENT (OUTPATIENT)
Dept: BEHAVIORAL HEALTH | Age: 57
End: 2022-02-08
Attending: PSYCHIATRY & NEUROLOGY

## 2022-02-09 ENCOUNTER — APPOINTMENT (OUTPATIENT)
Dept: BEHAVIORAL HEALTH | Age: 57
End: 2022-02-09
Attending: PSYCHIATRY & NEUROLOGY

## 2022-02-10 ENCOUNTER — APPOINTMENT (OUTPATIENT)
Dept: BEHAVIORAL HEALTH | Age: 57
End: 2022-02-10
Attending: PSYCHIATRY & NEUROLOGY

## 2022-02-11 ENCOUNTER — TELEPHONE ENCOUNTER (OUTPATIENT)
Dept: URBAN - METROPOLITAN AREA CLINIC 23 | Facility: CLINIC | Age: 57
End: 2022-02-11

## 2022-02-11 ENCOUNTER — APPOINTMENT (OUTPATIENT)
Dept: BEHAVIORAL HEALTH | Age: 57
End: 2022-02-11
Attending: PSYCHIATRY & NEUROLOGY

## 2022-02-14 ENCOUNTER — APPOINTMENT (OUTPATIENT)
Dept: BEHAVIORAL HEALTH | Age: 57
End: 2022-02-14
Attending: PSYCHIATRY & NEUROLOGY

## 2022-02-15 ENCOUNTER — APPOINTMENT (OUTPATIENT)
Dept: BEHAVIORAL HEALTH | Age: 57
End: 2022-02-15
Attending: PSYCHIATRY & NEUROLOGY

## 2023-04-19 ENCOUNTER — WALK IN (OUTPATIENT)
Dept: URGENT CARE | Age: 58
End: 2023-04-19

## 2023-04-19 VITALS
SYSTOLIC BLOOD PRESSURE: 126 MMHG | TEMPERATURE: 98.1 F | HEART RATE: 87 BPM | OXYGEN SATURATION: 99 % | DIASTOLIC BLOOD PRESSURE: 84 MMHG

## 2023-04-19 DIAGNOSIS — R05.1 ACUTE COUGH: ICD-10-CM

## 2023-04-19 DIAGNOSIS — J06.9 ACUTE URI: Primary | ICD-10-CM

## 2023-04-19 DIAGNOSIS — U07.1 COVID-19 VIRUS DETECTED: ICD-10-CM

## 2023-04-19 LAB
FLUAV AG UPPER RESP QL IA.RAPID: NEGATIVE
FLUBV AG UPPER RESP QL IA.RAPID: NEGATIVE
SARS-COV+SARS-COV-2 AG RESP QL IA.RAPID: DETECTED

## 2023-04-19 PROCEDURE — 87804 INFLUENZA ASSAY W/OPTIC: CPT | Performed by: NURSE PRACTITIONER

## 2023-04-19 PROCEDURE — 99213 OFFICE O/P EST LOW 20 MIN: CPT | Performed by: NURSE PRACTITIONER

## 2023-04-19 PROCEDURE — 87426 SARSCOV CORONAVIRUS AG IA: CPT | Performed by: NURSE PRACTITIONER

## 2023-04-19 RX ORDER — ONDANSETRON 4 MG/1
8 TABLET, FILM COATED ORAL 3 TIMES DAILY PRN
COMMUNITY
Start: 2023-03-30

## 2023-04-19 RX ORDER — MERCAPTOPURINE 50 MG/1
50 TABLET ORAL DAILY
COMMUNITY
Start: 2023-03-30

## 2023-04-19 RX ORDER — MIRTAZAPINE 7.5 MG/1
TABLET, FILM COATED ORAL
COMMUNITY
Start: 2023-03-29

## 2023-04-19 RX ORDER — OMEPRAZOLE 40 MG/1
40 CAPSULE, DELAYED RELEASE ORAL EVERY MORNING
COMMUNITY
Start: 2023-03-14

## 2023-04-19 RX ORDER — ALPRAZOLAM 0.5 MG/1
0.5 TABLET ORAL 2 TIMES DAILY PRN
COMMUNITY
Start: 2023-03-21

## 2023-04-19 RX ORDER — BENZONATATE 200 MG/1
200 CAPSULE ORAL 3 TIMES DAILY PRN
Qty: 15 CAPSULE | Refills: 0 | Status: SHIPPED | OUTPATIENT
Start: 2023-04-19 | End: 2023-04-24

## 2023-04-19 RX ORDER — ZOLPIDEM TARTRATE 10 MG/1
TABLET ORAL
COMMUNITY
Start: 2023-03-22

## 2023-08-08 ENCOUNTER — APPOINTMENT (OUTPATIENT)
Dept: URGENT CARE | Age: 58
End: 2023-08-08

## 2023-08-08 ENCOUNTER — WALK IN (OUTPATIENT)
Dept: URGENT CARE | Age: 58
End: 2023-08-08

## 2023-08-08 VITALS
SYSTOLIC BLOOD PRESSURE: 119 MMHG | OXYGEN SATURATION: 99 % | TEMPERATURE: 98.1 F | DIASTOLIC BLOOD PRESSURE: 78 MMHG | HEART RATE: 82 BPM

## 2023-08-08 DIAGNOSIS — J40 BRONCHITIS: ICD-10-CM

## 2023-08-08 DIAGNOSIS — J02.9 PHARYNGITIS, UNSPECIFIED ETIOLOGY: Primary | ICD-10-CM

## 2023-08-08 DIAGNOSIS — J01.90 ACUTE NON-RECURRENT SINUSITIS, UNSPECIFIED LOCATION: ICD-10-CM

## 2023-08-08 LAB
INTERNAL PROCEDURAL CONTROLS ACCEPTABLE: YES
S PYO AG THROAT QL IA.RAPID: NEGATIVE

## 2023-08-08 PROCEDURE — 99213 OFFICE O/P EST LOW 20 MIN: CPT | Performed by: NURSE PRACTITIONER

## 2023-08-08 PROCEDURE — 87880 STREP A ASSAY W/OPTIC: CPT | Performed by: NURSE PRACTITIONER

## 2023-08-08 PROCEDURE — 87081 CULTURE SCREEN ONLY: CPT | Performed by: CLINICAL MEDICAL LABORATORY

## 2023-08-08 RX ORDER — MIRTAZAPINE 7.5 MG/1
7.5 TABLET, FILM COATED ORAL DAILY
COMMUNITY
Start: 2023-06-20 | End: 2023-09-18

## 2023-08-08 RX ORDER — BENZONATATE 200 MG/1
200 CAPSULE ORAL 3 TIMES DAILY PRN
Qty: 15 CAPSULE | Refills: 0 | Status: SHIPPED | OUTPATIENT
Start: 2023-08-08 | End: 2023-08-13

## 2023-08-08 RX ORDER — ZOLPIDEM TARTRATE 10 MG/1
10 TABLET ORAL DAILY PRN
COMMUNITY
Start: 2023-06-20

## 2023-08-08 RX ORDER — AMOXICILLIN AND CLAVULANATE POTASSIUM 875; 125 MG/1; MG/1
1 TABLET, FILM COATED ORAL 2 TIMES DAILY
Qty: 20 TABLET | Refills: 0 | Status: SHIPPED | OUTPATIENT
Start: 2023-08-08 | End: 2023-08-18

## 2023-08-08 RX ORDER — MERCAPTOPURINE 50 MG/1
1 TABLET ORAL DAILY
COMMUNITY

## 2023-08-08 RX ORDER — ALBUTEROL SULFATE 90 UG/1
2 AEROSOL, METERED RESPIRATORY (INHALATION) EVERY 4 HOURS PRN
Qty: 1 EACH | Refills: 0 | Status: SHIPPED | OUTPATIENT
Start: 2023-08-08

## 2023-08-08 RX ORDER — CHLORHEXIDINE GLUCONATE ORAL RINSE 1.2 MG/ML
SOLUTION DENTAL
COMMUNITY
Start: 2023-05-02

## 2023-08-11 ENCOUNTER — TELEPHONE (OUTPATIENT)
Dept: URGENT CARE | Age: 58
End: 2023-08-11

## 2023-08-11 LAB — S PYO SPEC QL CULT: NORMAL
